# Patient Record
Sex: FEMALE
[De-identification: names, ages, dates, MRNs, and addresses within clinical notes are randomized per-mention and may not be internally consistent; named-entity substitution may affect disease eponyms.]

---

## 2021-03-09 ENCOUNTER — TRANSCRIPTION ENCOUNTER (OUTPATIENT)
Age: 56
End: 2021-03-09

## 2024-02-24 ENCOUNTER — NON-APPOINTMENT (OUTPATIENT)
Age: 59
End: 2024-02-24

## 2024-07-02 PROBLEM — Z00.00 ENCOUNTER FOR PREVENTIVE HEALTH EXAMINATION: Status: ACTIVE | Noted: 2024-07-02

## 2024-07-03 ENCOUNTER — APPOINTMENT (OUTPATIENT)
Dept: SURGERY | Facility: CLINIC | Age: 59
End: 2024-07-03
Payer: COMMERCIAL

## 2024-07-03 VITALS
BODY MASS INDEX: 38.48 KG/M2 | HEART RATE: 78 BPM | HEIGHT: 59.84 IN | OXYGEN SATURATION: 99 % | SYSTOLIC BLOOD PRESSURE: 106 MMHG | TEMPERATURE: 98.1 F | WEIGHT: 196 LBS | DIASTOLIC BLOOD PRESSURE: 74 MMHG

## 2024-07-03 DIAGNOSIS — Z78.9 OTHER SPECIFIED HEALTH STATUS: ICD-10-CM

## 2024-07-03 DIAGNOSIS — Z82.49 FAMILY HISTORY OF ISCHEMIC HEART DISEASE AND OTHER DISEASES OF THE CIRCULATORY SYSTEM: ICD-10-CM

## 2024-07-03 DIAGNOSIS — Z80.3 FAMILY HISTORY OF MALIGNANT NEOPLASM OF BREAST: ICD-10-CM

## 2024-07-03 DIAGNOSIS — I10 ESSENTIAL (PRIMARY) HYPERTENSION: ICD-10-CM

## 2024-07-03 DIAGNOSIS — K43.2 INCISIONAL HERNIA W/OUT OBSTRUCTION OR GANGRENE: ICD-10-CM

## 2024-07-03 PROCEDURE — 99243 OFF/OP CNSLTJ NEW/EST LOW 30: CPT

## 2024-07-03 RX ORDER — AMLODIPINE BESYLATE 5 MG/1
TABLET ORAL
Refills: 0 | Status: ACTIVE | COMMUNITY

## 2024-07-03 RX ORDER — SEMAGLUTIDE 1.34 MG/ML
2 INJECTION, SOLUTION SUBCUTANEOUS
Refills: 0 | Status: COMPLETED | COMMUNITY
End: 2024-07-03

## 2024-07-05 LAB
ALBUMIN SERPL ELPH-MCNC: 4.3 G/DL
ALP BLD-CCNC: 135 U/L
ALT SERPL-CCNC: 18 U/L
ANION GAP SERPL CALC-SCNC: 15 MMOL/L
AST SERPL-CCNC: 20 U/L
BILIRUB SERPL-MCNC: 0.4 MG/DL
BUN SERPL-MCNC: 11 MG/DL
CALCIUM SERPL-MCNC: 9.2 MG/DL
CHLORIDE SERPL-SCNC: 101 MMOL/L
CO2 SERPL-SCNC: 22 MMOL/L
CREAT SERPL-MCNC: 0.8 MG/DL
EGFR: 86 ML/MIN/1.73M2
GLUCOSE SERPL-MCNC: 73 MG/DL
POTASSIUM SERPL-SCNC: 4.7 MMOL/L
PROT SERPL-MCNC: 7.3 G/DL
SODIUM SERPL-SCNC: 137 MMOL/L

## 2024-07-10 ENCOUNTER — APPOINTMENT (OUTPATIENT)
Dept: CT IMAGING | Facility: CLINIC | Age: 59
End: 2024-07-10

## 2024-07-10 PROCEDURE — 74177 CT ABD & PELVIS W/CONTRAST: CPT

## 2024-07-24 ENCOUNTER — APPOINTMENT (OUTPATIENT)
Dept: SURGERY | Facility: CLINIC | Age: 59
End: 2024-07-24
Payer: COMMERCIAL

## 2024-07-24 VITALS
HEART RATE: 66 BPM | TEMPERATURE: 98.7 F | BODY MASS INDEX: 36.44 KG/M2 | WEIGHT: 198 LBS | HEIGHT: 62 IN | DIASTOLIC BLOOD PRESSURE: 83 MMHG | OXYGEN SATURATION: 95 % | SYSTOLIC BLOOD PRESSURE: 134 MMHG

## 2024-07-24 DIAGNOSIS — K43.2 INCISIONAL HERNIA W/OUT OBSTRUCTION OR GANGRENE: ICD-10-CM

## 2024-07-24 PROCEDURE — 99213 OFFICE O/P EST LOW 20 MIN: CPT

## 2024-07-24 NOTE — CONSULT LETTER
[Dear  ___] : Dear  [unfilled], [Courtesy Letter:] : I had the pleasure of seeing your patient, [unfilled], in my office today. [Consult Closing:] : Thank you very much for allowing me to participate in the care of this patient.  If you have any questions, please do not hesitate to contact me. [Sincerely,] : Sincerely, [FreeTextEntry3] : Yolanda Romero MD FACS

## 2024-07-24 NOTE — HISTORY OF PRESENT ILLNESS
[de-identified] : OSCAR DUFFY is a 59 year old female who presents in the office for follow up visit with incisional hernia. Patient had CT of the abdomen and pelvis that showed Small fat-containing umbilical hernia. Pancreatic neck 0.6 cm hypodensity/cyst, likely IPMN. Right adnexal 1.9 cm cyst. and Uterine fibroids. Pain is probably due to large fibroid. Patient denies any discomfort at the umbilical area, no surgical intervention needed at present time. She was advised to follow with GYN for Right adnexal 1.9 cm cyst. and Uterine fibroids. and Follow up with Dr Rendon Pancreatic neck 0.6 cm hypodensity/cyst, information is given.

## 2024-07-24 NOTE — ASSESSMENT
[FreeTextEntry1] : OSCAR DUFFY is a 59 year old female with abdominal bulging and abdominal pain   Patient Has PSHX of  section, Laparoscopic cholecystectomy, and sleeve gastrectomy 2018 Dr England [facility].  Results of her recent imaging and physical examination findings were discussed in detail. she was informed of significance of findings. All of the options, risks and benefits were explained.   Patient denies any discomfort at the umbilical area, no surgical intervention needed at present time  She was advised to follow with GYN for Right adnexal 1.9 cm cyst. and Uterine fibroids. and Follow up with Dr Rendon Pancreatic neck 0.6 cm hypodensity/cyst, information is given.

## 2024-07-24 NOTE — PHYSICAL EXAM
[Normal Breath Sounds] : Normal breath sounds [Normal Heart Sounds] : normal heart sounds [Normal Rate and Rhythm] : normal rate and rhythm [No Rash or Lesion] : No rash or lesion [Alert] : alert [Oriented to Person] : oriented to person [Oriented to Place] : oriented to place [Oriented to Time] : oriented to time [Calm] : calm [de-identified] : The patient is alert, well-groomed  [de-identified] : Normoactive bowel sounds, soft and nontender, no hepatosplenomegaly or masses noted, small umbilical hernia noted [de-identified] : full range of motion and no deformities appreciated.

## 2024-08-08 PROBLEM — K86.2 PANCREATIC CYST: Status: ACTIVE | Noted: 2024-08-08

## 2024-08-13 ENCOUNTER — APPOINTMENT (OUTPATIENT)
Dept: SURGICAL ONCOLOGY | Facility: CLINIC | Age: 59
End: 2024-08-13

## 2024-08-13 VITALS
DIASTOLIC BLOOD PRESSURE: 81 MMHG | HEART RATE: 64 BPM | SYSTOLIC BLOOD PRESSURE: 118 MMHG | OXYGEN SATURATION: 97 % | WEIGHT: 194 LBS | BODY MASS INDEX: 36.63 KG/M2 | HEIGHT: 61 IN

## 2024-08-13 DIAGNOSIS — K86.2 CYST OF PANCREAS: ICD-10-CM

## 2024-08-13 PROCEDURE — 99214 OFFICE O/P EST MOD 30 MIN: CPT

## 2024-08-13 RX ORDER — AMLODIPINE AND OLMESARTAN MEDOXOMIL 5; 20 MG/1; MG/1
5-20 TABLET ORAL
Refills: 0 | Status: ACTIVE | COMMUNITY

## 2024-08-15 NOTE — HISTORY OF PRESENT ILLNESS
[de-identified] : 60y/o female referred by Dr. Romero with a 0.6cm pancreatic neck cyst seen on CT scan during a surveillance scan for an incisional hernia. Pt c/o abdominal tightness denies nausea or vomiting. BM are normal in color with no bleeding, BM every other day. Denies weight loss. History of pancreatitis and ERCP.      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 Franco   PCP Dr. Liliana Dawson

## 2024-08-15 NOTE — CONSULT LETTER
[Dear  ___] : Dear  [unfilled], [DrJefferson  ___] : Dr. LACEY [Courtesy Letter:] : I had the pleasure of seeing your patient, [unfilled], in my office today. [Sincerely,] : Sincerely, [FreeTextEntry3] : Sriram Rendon MD, LOYDA, FACS Director of Surgical Oncology- [facility] , Department of Surgery [facility] at [place] [id number] Ph: [phone] Cell: [phone]

## 2024-08-15 NOTE — HISTORY OF PRESENT ILLNESS
[de-identified] : 60y/o female referred by Dr. Romero with a 0.6cm pancreatic neck cyst seen on CT scan during a surveillance scan for an incisional hernia. Pt c/o abdominal tightness denies nausea or vomiting. BM are normal in color with no bleeding, BM every other day. Denies weight loss. History of pancreatitis and ERCP.      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 Franco   PCP Dr. Liliana Dawson

## 2024-08-15 NOTE — PHYSICAL EXAM
[Normal] : supple, no neck mass and thyroid not enlarged [Normal Neck Lymph Nodes] : normal neck lymph nodes  [Normal Supraclavicular Lymph Nodes] : normal supraclavicular lymph nodes [Normal Groin Lymph Nodes] : normal groin lymph nodes [Normal Axillary Lymph Nodes] : normal axillary lymph nodes [Normal] : normal external exam, normal sphincter tone; no palpable masses; stool guaiac negative [FreeTextEntry1] :  COVID -19 precautions as per [facility] policy was universally followed. [de-identified] :  Abdomen soft, non-tender, non-distended, and no palpable masses.

## 2024-08-15 NOTE — ASSESSMENT
[FreeTextEntry1] : 58 y/o female 0.6cm pancreatic neck cyst   7/10/2024 CT A/P-  Small fat-containing umbilical hernia. Pancreatic neck 0.6 cm hypodensity/cyst, likely IPMN. Right adnexal 1.9 cm cyst.  Plan:  MRCP order for surveillance  RTO 1 month

## 2024-08-15 NOTE — CONSULT LETTER
[Dear  ___] : Dear  [unfilled], [DrJefferson  ___] : Dr. LACEY [Courtesy Letter:] : I had the pleasure of seeing your patient, [unfilled], in my office today. [Sincerely,] : Sincerely, [FreeTextEntry3] : Sriram Rendon MD, LOYDA, FACS Director of Surgical Oncology- [facility] , Department of Surgery [facility] at [place] [id number] Ph: [phone] Cell: [phone]

## 2024-08-15 NOTE — PHYSICAL EXAM
[Normal] : supple, no neck mass and thyroid not enlarged [Normal Neck Lymph Nodes] : normal neck lymph nodes  [Normal Supraclavicular Lymph Nodes] : normal supraclavicular lymph nodes [Normal Groin Lymph Nodes] : normal groin lymph nodes [Normal Axillary Lymph Nodes] : normal axillary lymph nodes [Normal] : normal external exam, normal sphincter tone; no palpable masses; stool guaiac negative [FreeTextEntry1] :  COVID -19 precautions as per [facility] policy was universally followed. [de-identified] :  Abdomen soft, non-tender, non-distended, and no palpable masses.

## 2024-08-15 NOTE — PHYSICAL EXAM
[Normal] : supple, no neck mass and thyroid not enlarged [Normal Neck Lymph Nodes] : normal neck lymph nodes  [Normal Supraclavicular Lymph Nodes] : normal supraclavicular lymph nodes [Normal Groin Lymph Nodes] : normal groin lymph nodes [Normal Axillary Lymph Nodes] : normal axillary lymph nodes [Normal] : normal external exam, normal sphincter tone; no palpable masses; stool guaiac negative [FreeTextEntry1] :  COVID -19 precautions as per [facility] policy was universally followed. [de-identified] :  Abdomen soft, non-tender, non-distended, and no palpable masses.

## 2024-08-15 NOTE — HISTORY OF PRESENT ILLNESS
[de-identified] : 58y/o female referred by Dr. Romero with a 0.6cm pancreatic neck cyst seen on CT scan during a surveillance scan for an incisional hernia. Pt c/o abdominal tightness denies nausea or vomiting. BM are normal in color with no bleeding, BM every other day. Denies weight loss. History of pancreatitis and ERCP.      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 Franco   PCP Dr. Liliana Dawson

## 2024-08-15 NOTE — CONSULT LETTER
[Dear  ___] : Dear  [unfilled], [DrJefferson  ___] : Dr. LACEY [Courtesy Letter:] : I had the pleasure of seeing your patient, [unfilled], in my office today. [Sincerely,] : Sincerely, [FreeTextEntry3] : Srirma Rendon MD, LOYDA, FACS Director of Surgical Oncology- [facility] , Department of Surgery [facility] at [place] [id number] Ph: [phone] Cell: [phone]

## 2024-09-03 ENCOUNTER — APPOINTMENT (OUTPATIENT)
Dept: MRI IMAGING | Facility: CLINIC | Age: 59
End: 2024-09-03
Payer: COMMERCIAL

## 2024-09-03 ENCOUNTER — LABORATORY RESULT (OUTPATIENT)
Age: 59
End: 2024-09-03

## 2024-09-03 PROCEDURE — A9585: CPT | Mod: JW

## 2024-09-03 PROCEDURE — 74183 MRI ABD W/O CNTR FLWD CNTR: CPT

## 2024-09-08 ENCOUNTER — NON-APPOINTMENT (OUTPATIENT)
Age: 59
End: 2024-09-08

## 2024-09-11 ENCOUNTER — APPOINTMENT (OUTPATIENT)
Dept: GASTROENTEROLOGY | Facility: CLINIC | Age: 59
End: 2024-09-11
Payer: COMMERCIAL

## 2024-09-11 VITALS
OXYGEN SATURATION: 99 % | SYSTOLIC BLOOD PRESSURE: 98 MMHG | BODY MASS INDEX: 34.93 KG/M2 | WEIGHT: 185 LBS | HEART RATE: 70 BPM | HEIGHT: 61 IN | DIASTOLIC BLOOD PRESSURE: 62 MMHG

## 2024-09-11 DIAGNOSIS — Z82.49 FAMILY HISTORY OF ISCHEMIC HEART DISEASE AND OTHER DISEASES OF THE CIRCULATORY SYSTEM: ICD-10-CM

## 2024-09-11 DIAGNOSIS — K80.50 CALCULUS OF BILE DUCT W/OUT CHOLANGITIS OR CHOLECYSTITIS W/OUT OBSTRUCTION: ICD-10-CM

## 2024-09-11 DIAGNOSIS — K86.2 CYST OF PANCREAS: ICD-10-CM

## 2024-09-11 DIAGNOSIS — I10 ESSENTIAL (PRIMARY) HYPERTENSION: ICD-10-CM

## 2024-09-11 PROCEDURE — 99204 OFFICE O/P NEW MOD 45 MIN: CPT

## 2024-09-11 RX ORDER — OMEPRAZOLE 40 MG/1
40 CAPSULE, DELAYED RELEASE ORAL
Refills: 0 | Status: ACTIVE | COMMUNITY

## 2024-09-11 RX ORDER — SEMAGLUTIDE 1 MG/.5ML
1 INJECTION, SOLUTION SUBCUTANEOUS
Refills: 0 | Status: ACTIVE | COMMUNITY

## 2024-09-11 NOTE — ASSESSMENT
[FreeTextEntry1] : 59F with pmhx of sleeve gastrectomy, HTN, GERD presenting for evaluation for pancreatic cyst and possible choledocholithiasis. Reports complicated cholecystectomy in 2022 at [facility] requiring subtotal and c/b bile leak s/p ERCP with stent placement and eventual removal c/b post ERCP pancreatitis. Notes has had intermittent RUQ pains since then. Had recent MRI to evaluate pancreatic cyst showing possible 3 mm stone in the distal cbd. Denies any other complaints, no n/v/d/c, melena, hematochezia, fever/chills, jaundice, dark urine, or other issues.  - EUS +/- ERCP.

## 2024-09-11 NOTE — HISTORY OF PRESENT ILLNESS
[FreeTextEntry1] : 59F with pmhx of sleeve gastrectomy, HTN, GERD presenting for evaluation for pancreatic cyst and possible choledocholithiasis. Reports complicated cholecystectomy in 2022 at [facility] requiring subtotal and c/b bile leak s/p ERCP with stent placement and eventual removal. Notes has had intermittent RUQ pains since then. Had recent MRI to evaluate pancreatic cyst showing possible 3 mm stone in the distal cbd. Denies any other complaints, no n/v/d/c, melena, hematochezia, fever/chills, jaundice, dark urine, or other issues.   Medications: Amlodipine, Wugovy, Omepraozle.

## 2024-09-11 NOTE — PHYSICAL EXAM
[Alert] : alert [Normal Voice/Communication] : normal voice/communication [Healthy Appearing] : healthy appearing [No Acute Distress] : no acute distress [Sclera] : the sclera and conjunctiva were normal [Hearing Threshold Finger Rub Not [place]] : hearing was normal [Normal Lips/Gums] : the lips and gums were normal [Oropharynx] : the oropharynx was normal [Normal Appearance] : the appearance of the neck was normal [No Neck Mass] : no neck mass was observed [No Respiratory Distress] : no respiratory distress [No Acc Muscle Use] : no accessory muscle use [Respiration, Rhythm And Depth] : normal respiratory rhythm and effort [Auscultation Breath Sounds / Voice Sounds] : lungs were clear to auscultation bilaterally [Heart Rate And Rhythm] : heart rate was normal and rhythm regular [Normal S1, S2] : normal S1 and S2 [Murmurs] : no murmurs [Bowel Sounds] : normal bowel sounds [Abdomen Tenderness] : non-tender [No Masses] : no abdominal mass palpated [Abdomen Soft] : soft [] : no hepatosplenomegaly [Oriented To Time, Place, And Person] : oriented to person, place, and time

## 2024-09-27 ENCOUNTER — APPOINTMENT (OUTPATIENT)
Dept: GASTROENTEROLOGY | Facility: HOSPITAL | Age: 59
End: 2024-09-27

## 2024-09-27 ENCOUNTER — TRANSCRIPTION ENCOUNTER (OUTPATIENT)
Age: 59
End: 2024-09-27

## 2024-09-27 ENCOUNTER — RESULT REVIEW (OUTPATIENT)
Age: 59
End: 2024-09-27

## 2024-10-02 ENCOUNTER — APPOINTMENT (OUTPATIENT)
Dept: SURGICAL ONCOLOGY | Facility: CLINIC | Age: 59
End: 2024-10-02
Payer: COMMERCIAL

## 2024-10-02 PROCEDURE — 99214 OFFICE O/P EST MOD 30 MIN: CPT

## 2024-10-02 NOTE — CONSULT LETTER
[Courtesy Letter:] : I had the pleasure of seeing your patient, [unfilled], in my office today. [Sincerely,] : Sincerely, [DrJefferson  ___] : Dr. LACEY [Dear  ___] : Dear  [unfilled], [Please see my note below.] : Please see my note below. [Consult Closing:] : Thank you very much for allowing me to participate in the care of this patient.  If you have any questions, please do not hesitate to contact me. [FreeTextEntry3] : Sriram Rendon MD, LOYDA, FACS Director of Surgical Oncology- [facility] , Department of Surgery [facility] at [place] [id number] Ph: [phone] Cell: [phone]

## 2024-10-02 NOTE — PHYSICAL EXAM
[Normal] : supple, no neck mass and thyroid not enlarged [Normal Neck Lymph Nodes] : normal neck lymph nodes  [Normal Supraclavicular Lymph Nodes] : normal supraclavicular lymph nodes [Normal Groin Lymph Nodes] : normal groin lymph nodes [Normal Axillary Lymph Nodes] : normal axillary lymph nodes [Normal] : oriented to person, place and time, with appropriate affect [FreeTextEntry1] :  COVID -19 precautions as per [facility] policy was universally followed. [de-identified] :  Abdomen soft, non-tender, non-distended, and no palpable masses.

## 2024-10-02 NOTE — HISTORY OF PRESENT ILLNESS
[de-identified] : 58y/o female referred by Dr. Romero with a 0.6cm pancreatic neck cyst seen on CT scan during a surveillance scan for an incisional hernia. Pt c/o abdominal tightness denies nausea or vomiting. BM are normal in color with no bleeding, BM every other day. Denies weight loss. History of pancreatitis and ERCP.   9/3/24-MRCP * Fluid-containing structure in the gallbladder fossa measuring 3.3 x 1.7 cm, possibly a contracted gallbladder. However, the patient reports history of gallbladder surgery, and other considerations include residual gallbladder in the setting of partial cholecystectomy or a gallbladder fossa collection, which is unchanged from 7/10/2024. * Dilated common bile duct to 8 mm, unchanged since 7/10/2024, and potentially secondary to post cholecystectomy state. Question a 3 mm rounded filling defect in the distal CBD near the ampulla, possibly reflecting choledocholithiasis. Consider endoscopic evaluation to further assess for choledocholithiasis or other etiology for duct dilation such as ampullary stricture or occult lesion. * Several cystic lesions in the pancreas measuring up to 7 mm without worrisome features, which may represent side branch IPMN. No main pancreatic duct dilation. Recommend continued follow-up, suggest abdominal MRI/MRCP in one year. * Left adnexal structure measuring 4.7 cm, unchanged from 7/10/2024, and may reflect an exophytic fibroid. Suggest follow-up with pelvic ultrasound. S/p 24 - EUS with evidence of retained gallbladder remnant stone/sludge and subcentimeter pancreatic cysts without high-risk features. - ERCP with bile duct clearance after sphincteroplasty. EGD with salmon colored mucosa (2 cm) and esophageal ulcer, biopsied.  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 Franco   PCP Dr. Liliana Dawson

## 2024-10-02 NOTE — PHYSICAL EXAM
[Normal] : supple, no neck mass and thyroid not enlarged [Normal Neck Lymph Nodes] : normal neck lymph nodes  [Normal Supraclavicular Lymph Nodes] : normal supraclavicular lymph nodes [Normal Groin Lymph Nodes] : normal groin lymph nodes [Normal Axillary Lymph Nodes] : normal axillary lymph nodes [Normal] : oriented to person, place and time, with appropriate affect [FreeTextEntry1] :  COVID -19 precautions as per [facility] policy was universally followed. [de-identified] :  Abdomen soft, non-tender, non-distended, and no palpable masses.

## 2024-10-02 NOTE — HISTORY OF PRESENT ILLNESS
[de-identified] : 60y/o female referred by Dr. Romero with a 0.6cm pancreatic neck cyst seen on CT scan during a surveillance scan for an incisional hernia. Pt c/o abdominal tightness denies nausea or vomiting. BM are normal in color with no bleeding, BM every other day. Denies weight loss. History of pancreatitis and ERCP.   9/3/24-MRCP * Fluid-containing structure in the gallbladder fossa measuring 3.3 x 1.7 cm, possibly a contracted gallbladder. However, the patient reports history of gallbladder surgery, and other considerations include residual gallbladder in the setting of partial cholecystectomy or a gallbladder fossa collection, which is unchanged from 7/10/2024. * Dilated common bile duct to 8 mm, unchanged since 7/10/2024, and potentially secondary to post cholecystectomy state. Question a 3 mm rounded filling defect in the distal CBD near the ampulla, possibly reflecting choledocholithiasis. Consider endoscopic evaluation to further assess for choledocholithiasis or other etiology for duct dilation such as ampullary stricture or occult lesion. * Several cystic lesions in the pancreas measuring up to 7 mm without worrisome features, which may represent side branch IPMN. No main pancreatic duct dilation. Recommend continued follow-up, suggest abdominal MRI/MRCP in one year. * Left adnexal structure measuring 4.7 cm, unchanged from 7/10/2024, and may reflect an exophytic fibroid. Suggest follow-up with pelvic ultrasound. S/p 24 - EUS with evidence of retained gallbladder remnant stone/sludge and subcentimeter pancreatic cysts without high-risk features. - ERCP with bile duct clearance after sphincteroplasty. EGD with salmon colored mucosa (2 cm) and esophageal ulcer, biopsied.  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 Franco   PCP Dr. Liliana Dawson

## 2024-10-02 NOTE — CONSULT LETTER
[Courtesy Letter:] : I had the pleasure of seeing your patient, [unfilled], in my office today. [Sincerely,] : Sincerely, [DrJefferson  ___] : Dr. LACEY [Dear  ___] : Dear  [unfilled], [Please see my note below.] : Please see my note below. [Consult Closing:] : Thank you very much for allowing me to participate in the care of this patient.  If you have any questions, please do not hesitate to contact me. [FreeTextEntry3] : Sriram Rendon MD, LOYDA, FACS Director of Surgical Oncology- [facility] , Department of Surgery [facility] at [place] [id number] Ph: [phone] Cell: [phone]

## 2024-10-02 NOTE — ASSESSMENT
[FreeTextEntry1] : 60 y/o female 0.6cm pancreatic neck cyst - likely branch duct IPMN no high risk features  9/3/24 MRCP-  Fluid-containing structure in the gallbladder fossa measuring 3.3 x 1.7 cm, possibly a contracted gallbladder.Dilated common bile duct to 8 mm, unchanged since 7/10/2024, and potentially secondary to postcholecystectomy state. Question a 3 mm rounded filling defect in the distal CBD near the ampulla, possibly reflecting choledocholithiasis. Several cystic lesions in the pancreas measuring up to 7 mm without worrisome features, which may represent side branch IPMN. No main pancreatic duct dilation.  9/27/27- EUS with evidence of retained gallbladder remnant stone/sludge and Sub centimeter pancreatic cysts without high-risk features. - ERCP with bile duct clearance after sphincteroplasty. EGD with salmon colored mucosa (2 cm) and esophageal ulcer, biopsied.  Plan:  -MRCP order for surveillance of stable pancreatic cyst - 1 year - Discussed completion cholecystectomy vs observation vs endoscopic approach to address the stone in the remnant gall bladder- pt prefers to hold off on surgery at this time    I have discussed the diagnosis, therapeutic plan and options with the patient at length. Patient expressed verbal understanding to proceed with proposed plan. All questions answered.